# Patient Record
Sex: MALE | Race: WHITE
[De-identification: names, ages, dates, MRNs, and addresses within clinical notes are randomized per-mention and may not be internally consistent; named-entity substitution may affect disease eponyms.]

---

## 2019-10-05 ENCOUNTER — HOSPITAL ENCOUNTER (OUTPATIENT)
Dept: HOSPITAL 76 - DI | Age: 74
Discharge: HOME | End: 2019-10-05
Attending: FAMILY MEDICINE
Payer: MEDICARE

## 2019-10-05 DIAGNOSIS — M51.36: Primary | ICD-10-CM

## 2019-10-05 DIAGNOSIS — M41.86: ICD-10-CM

## 2019-10-05 PROCEDURE — 72100 X-RAY EXAM L-S SPINE 2/3 VWS: CPT

## 2019-10-07 NOTE — XRAY REPORT
Reason:  ACUTE LBP

Procedure Date:  10/05/2019   

Accession Number:  [id number] / [id number]                    

Procedure:  XR  - Lumbar Spine 2 View CPT Code:  

 

FULL RESULT:

 

 

EXAM:

LUMBOSACRAL SPINE RADIOGRAPHY

 

EXAM DATE: 10/5/2019 01:41 PM.

 

CLINICAL HISTORY: ACUTE LBP.

 

COMPARISONS: None.

 

TECHNIQUE: 3 views.

 

FINDINGS:

Alignment: Dextroscoliosis of upper lumbar spine.

 

Bones: Osseous structures appear mildly osteopenic. Five non-rib-bearing 

lumbar vertebral bodies are present. No fractures or bone lesions evident.

 

Disks: Suspected disk space narrowing in the lumbar spine although 

evaluation is limited on provided images.

 

Facets: Facet degenerative changes are present, most prominent in the 

lower lumbar spine.

 

Sacroiliac Joints: Unremarkable.

 

Soft Tissues: Normal. The visualized bowel gas pattern is normal.

IMPRESSION:

1. Degenerative changes and scoliosis of lumbar spine.

2. No definite acute fracture evident.

 

[facility]